# Patient Record
Sex: FEMALE | Race: WHITE
[De-identification: names, ages, dates, MRNs, and addresses within clinical notes are randomized per-mention and may not be internally consistent; named-entity substitution may affect disease eponyms.]

---

## 2015-02-24 VITALS — DIASTOLIC BLOOD PRESSURE: 83 MMHG | SYSTOLIC BLOOD PRESSURE: 155 MMHG

## 2019-08-22 ENCOUNTER — HOSPITAL ENCOUNTER (OUTPATIENT)
Dept: HOSPITAL 44 - RAD | Age: 74
End: 2019-08-22
Attending: CLINIC/CENTER
Payer: MEDICARE

## 2019-08-22 DIAGNOSIS — M79.672: Primary | ICD-10-CM

## 2019-08-22 PROCEDURE — 73650 X-RAY EXAM OF HEEL: CPT

## 2019-08-23 NOTE — DIAGNOSTIC IMAGING REPORT
SHANA MANNING (NP) - OP 

[facility]

[address] [address] [address]

 

 

 

 

Report Submission Date: Aug 22, 2019 1:50:23 PM CDT

Patient       Study

Name:   CHANO CORREA       Date:   Aug 22, 2019 12:52:15 PM CDT

MRN:   [id number]       Modality Type:   DX

Gender:   F       Description:   CALCANEUS 2 VIEWS OR MORE

:   [birth year]       Institution:   [facility]

Physician:   SHANA MANNING (LILINAA) - OP

     Accession:    [id number]

 

 

Exam:  Left calcaneus.   



History: Pain.   



Semi axial and lateral projections of the left heel are submitted.   



No signs of acute fracture or dislocation is seen.  No bony erosions are seen.  
Soft tissue prominence over the heel is noted.   



Impression: 

No bony abnormality.   

Soft tissue swelling.

 

Electronically signed on Aug 22, 2019 1:50:23 PM CDT by:

Loyd ARZATE